# Patient Record
Sex: MALE | Race: WHITE
[De-identification: names, ages, dates, MRNs, and addresses within clinical notes are randomized per-mention and may not be internally consistent; named-entity substitution may affect disease eponyms.]

---

## 2017-07-20 ENCOUNTER — HOSPITAL ENCOUNTER (OUTPATIENT)
Dept: HOSPITAL 39 - LAB.O | Age: 82
End: 2017-07-20
Payer: MEDICARE

## 2017-07-20 DIAGNOSIS — E11.29: ICD-10-CM

## 2017-07-20 DIAGNOSIS — N18.4: Primary | ICD-10-CM

## 2017-07-20 DIAGNOSIS — I12.0: ICD-10-CM

## 2017-09-07 ENCOUNTER — HOSPITAL ENCOUNTER (OUTPATIENT)
Dept: HOSPITAL 39 - GMAJ | Age: 82
Discharge: HOME | End: 2017-09-07
Attending: FAMILY MEDICINE
Payer: MEDICARE

## 2017-09-07 DIAGNOSIS — I10: ICD-10-CM

## 2017-09-07 DIAGNOSIS — E11.9: ICD-10-CM

## 2017-09-07 DIAGNOSIS — Z12.5: Primary | ICD-10-CM

## 2017-11-21 ENCOUNTER — HOSPITAL ENCOUNTER (OUTPATIENT)
Dept: HOSPITAL 39 - LAB.O | Age: 82
Discharge: HOME | End: 2017-11-21
Attending: INTERNAL MEDICINE
Payer: MEDICARE

## 2017-11-21 DIAGNOSIS — N18.4: ICD-10-CM

## 2017-11-21 DIAGNOSIS — C64.2: Primary | ICD-10-CM

## 2018-03-28 ENCOUNTER — HOSPITAL ENCOUNTER (OUTPATIENT)
Dept: HOSPITAL 39 - LAB.O | Age: 83
End: 2018-03-28
Attending: INTERNAL MEDICINE
Payer: MEDICARE

## 2018-03-28 DIAGNOSIS — N18.3: Primary | ICD-10-CM

## 2018-03-28 DIAGNOSIS — I12.0: ICD-10-CM

## 2018-03-28 DIAGNOSIS — E11.9: ICD-10-CM

## 2018-07-17 ENCOUNTER — HOSPITAL ENCOUNTER (OUTPATIENT)
Dept: HOSPITAL 39 - GMAJ | Age: 83
End: 2018-07-17
Attending: FAMILY MEDICINE
Payer: MEDICARE

## 2018-07-17 DIAGNOSIS — N39.0: Primary | ICD-10-CM

## 2018-07-25 ENCOUNTER — HOSPITAL ENCOUNTER (OUTPATIENT)
Dept: HOSPITAL 39 - US | Age: 83
End: 2018-07-25
Attending: INTERNAL MEDICINE
Payer: MEDICARE

## 2018-07-25 DIAGNOSIS — C64.9: ICD-10-CM

## 2018-07-25 DIAGNOSIS — Z90.5: ICD-10-CM

## 2018-07-25 DIAGNOSIS — R31.9: Primary | ICD-10-CM

## 2018-07-25 NOTE — US
EXAM DESCRIPTION: 

Renal: Ultrasound.



CLINICAL HISTORY: 

HEMATURIA, RENAL CELL CARCINOMA. Right nephrectomy.



COMPARISON: 

Bilateral renal arterial Doppler evaluation on the same visit.



TECHNIQUE: 

Transcutaneous scanning: Two-dimensional and Doppler modes. 



FINDINGS: 

Left kidney measures 12.2 x 10.6 x 8.6 cm; mid-renal cortical

thickness 12 mm. . Lobulated hypoechoic heterogeneous mass with

mostly posterior acoustic enhancement measuring 7.5 x 7.1 x 6.5

cm. This appears to be involving the mid kidney and possibly

renal pelvis. Minimally vascular. No hydronephrosis No echogenic

stones. An anechoic cyst with well-defined margins and posterior

enhancement features measures 1.5 x 1.7 x 1.2 cm. Lobulated

contour of the kidney with no perinephric fluid.  Proximal ureter

not visualized. No fluid or soft tissue mass in the right renal

fossa.



Urinary bladder was  visualized. Prevoid volume 118.9 mL.

Ureteral jet in the bladder not seen.  Post void volume 21.3 mL.

Patient voided approximately 80% of initial volume, approximately

98 mL. Minimal bladder wall thickening subjectively. Abdominal

aorta diameter not measured .



IMPRESSION: 

1. 7.5 cm hypoechoic mass, minimally vascular in the region of

the kidney and renal pelvis. No hydronephrosis. Mostly posterior

enhancement features. Differential includes primary or metastatic

disease, complicated cyst or cyst with debris, or residual

inflammatory tissue. Recommend CT scan of the abdomen and pelvis

without and with IV contrast and with oral contrast.

2. Patient is able to void approximately 80% of initial urinary

bladder volume. Subjectively, the bladder wall appears thickened.



Electronically signed by:  Med Rodriguez MD  7/25/2018 7:55 PM CDT

Workstation: [id number]

## 2018-09-20 ENCOUNTER — HOSPITAL ENCOUNTER (OUTPATIENT)
Dept: HOSPITAL 39 - LAB.O | Age: 83
End: 2018-09-20
Attending: INTERNAL MEDICINE
Payer: MEDICARE

## 2018-09-20 DIAGNOSIS — I12.0: Primary | ICD-10-CM

## 2018-09-20 DIAGNOSIS — E11.9: ICD-10-CM

## 2018-09-20 DIAGNOSIS — E11.29: ICD-10-CM

## 2018-09-20 DIAGNOSIS — N18.4: ICD-10-CM

## 2019-02-19 ENCOUNTER — HOSPITAL ENCOUNTER (OUTPATIENT)
Dept: HOSPITAL 39 - LAB.O | Age: 84
End: 2019-02-19
Attending: INTERNAL MEDICINE
Payer: MEDICARE

## 2019-02-19 DIAGNOSIS — E11.29: ICD-10-CM

## 2019-02-19 DIAGNOSIS — E78.2: ICD-10-CM

## 2019-02-19 DIAGNOSIS — N18.4: Primary | ICD-10-CM

## 2019-02-19 DIAGNOSIS — E11.9: ICD-10-CM

## 2019-04-15 ENCOUNTER — HOSPITAL ENCOUNTER (OUTPATIENT)
Dept: HOSPITAL 39 - RAD | Age: 84
End: 2019-04-15
Attending: FAMILY MEDICINE
Payer: MEDICARE

## 2019-04-15 DIAGNOSIS — K44.9: ICD-10-CM

## 2019-04-15 DIAGNOSIS — K43.9: ICD-10-CM

## 2019-04-15 DIAGNOSIS — N28.89: Primary | ICD-10-CM

## 2019-04-15 DIAGNOSIS — Z90.5: ICD-10-CM

## 2019-04-15 DIAGNOSIS — Z90.49: ICD-10-CM

## 2019-04-15 DIAGNOSIS — M51.36: ICD-10-CM

## 2019-04-15 NOTE — CT
EXAM DESCRIPTION: 



Abdoment/Pelvis w/o Contrast



CLINICAL HISTORY: 



88 years, 88 years, Male, Male, HEMATURIA



COMPARISON: 



None.



TECHNIQUE: 



CT of the abdomen and pelvis is performed according to our non

contrast protocol

This exam was performed according to our departmental

dose-optimization program, which includes automated exposure

control, adjustment of the mA and/or kV according to patient size

and/or use of iterative reconstruction technique.



FINDINGS: 



The lung bases demonstrate patchy stranding or scarring at both

lung bases without dense consolidation or pleural effusion or

pulmonary mass. A small retrocardiac hiatal hernia is present.



The liver is small and normal in size without evident gallbladder

or solid or cystic hepatic mass or significant abnormal ductal

dilation. A small normal spleen is present with fatty infiltrated

pancreas. Small normal adrenal glands are noted.



The right kidney is is surgically absent and the aorta heavily

calcified without aneurysm or retroperitoneal adenopathy. The

left kidney is markedly abnormal with a large heterogeneous mass

replacing the posterior two thirds of the kidney and estimated at

a maximal diameter of a proximally 7.8 cm. The kidney is oriented

in an anterior to posterior direction and I am uncertain whether

the mass involves the lower pole or upper pole of the kidney.

Primary renal cell carcinoma is strongly suspected. Benign cyst

involving the cortex of the kidney more anteriorly is present as

well as significant caliectasis of the anterior pole of the

kidney. Further evaluation with contrast-enhanced examination is

recommended. Periaortic or retroperitoneal adenopathy is not

apparent. Left renal vein appears moderately distended and the

possibility of tumor involvement cannot be excluded on this

noncontrast study.



Anterior abdominal wall is markedly abnormal with rather marked

atrophy of the upper portion of the right rectus muscle and the

lower portion of the left rectus muscle. A very small umbilical

hernia is noted. On the left inferiorly a much larger

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

margin of the atrophic left rectus muscles and the lateral

abdominal musculature is noted.



Large and small bowel caliber is normal. The bladder is

incompletely distended and appears thick-walled. Stool distended

rectum is noted. A distinct or definite inguinal hernia is not

apparent.



The bony spine demonstrates degenerative disc changes at L4-5 and

to a lesser extent L5-S1 without bony destructive changes.





IMPRESSION: 



1.  Surgical absence of the right kidney with large almost 8 cm

mass three patent replacing the posterior aspect of the right

kidney with the kidney oriented in an anterior to posterior axis.

The mass is most consistent with a primary renal cell carcinoma

and enhanced CT or MRI examination recommended. Moderate

caliectasis of the anterior pole of the kidney is noted and the

possibility of renal vein involvement with mass cannot be

excluded.

2.  Small hiatal hernia and small fat-containing ventral hernia

with a much larger left lower quadrant anterior abdominal wall

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

3.  Surgical absence of the gallbladder and basilar linear

pulmonary stranding most consistent with scarring and/or

atelectasis.

4.  Lower lumbar degenerative disc disease without evidence of

bony destructive mass.



Electronically signed by:  Jarek Ramsey MD  4/15/2019 3:25 PM

CDT Workstation: [id number]

## 2019-07-05 ENCOUNTER — HOSPITAL ENCOUNTER (EMERGENCY)
Dept: HOSPITAL 39 - ER | Age: 84
Discharge: ANOTHER HOSPITAL | End: 2019-07-05
Payer: MEDICARE

## 2019-07-05 VITALS — SYSTOLIC BLOOD PRESSURE: 159 MMHG | DIASTOLIC BLOOD PRESSURE: 59 MMHG | OXYGEN SATURATION: 96 % | TEMPERATURE: 97.6 F

## 2019-07-05 DIAGNOSIS — I49.3: ICD-10-CM

## 2019-07-05 DIAGNOSIS — R79.89: ICD-10-CM

## 2019-07-05 DIAGNOSIS — I44.0: ICD-10-CM

## 2019-07-05 DIAGNOSIS — I10: ICD-10-CM

## 2019-07-05 DIAGNOSIS — Z79.899: ICD-10-CM

## 2019-07-05 DIAGNOSIS — C64.9: ICD-10-CM

## 2019-07-05 DIAGNOSIS — R11.2: ICD-10-CM

## 2019-07-05 DIAGNOSIS — Z79.82: ICD-10-CM

## 2019-07-05 DIAGNOSIS — Z87.891: ICD-10-CM

## 2019-07-05 DIAGNOSIS — I50.9: Primary | ICD-10-CM

## 2019-07-05 DIAGNOSIS — I48.91: ICD-10-CM

## 2019-07-05 DIAGNOSIS — E11.9: ICD-10-CM

## 2019-07-05 PROCEDURE — 36415 COLL VENOUS BLD VENIPUNCTURE: CPT

## 2019-07-05 PROCEDURE — 82550 ASSAY OF CK (CPK): CPT

## 2019-07-05 PROCEDURE — 83880 ASSAY OF NATRIURETIC PEPTIDE: CPT

## 2019-07-05 PROCEDURE — 85730 THROMBOPLASTIN TIME PARTIAL: CPT

## 2019-07-05 PROCEDURE — 82553 CREATINE MB FRACTION: CPT

## 2019-07-05 PROCEDURE — 93005 ELECTROCARDIOGRAM TRACING: CPT

## 2019-07-05 PROCEDURE — 85025 COMPLETE CBC W/AUTO DIFF WBC: CPT

## 2019-07-05 PROCEDURE — 85610 PROTHROMBIN TIME: CPT

## 2019-07-05 PROCEDURE — 84484 ASSAY OF TROPONIN QUANT: CPT

## 2019-07-05 PROCEDURE — 71045 X-RAY EXAM CHEST 1 VIEW: CPT

## 2019-07-05 PROCEDURE — 80048 BASIC METABOLIC PNL TOTAL CA: CPT

## 2019-07-05 PROCEDURE — 86140 C-REACTIVE PROTEIN: CPT

## 2019-07-05 NOTE — RAD
EXAM DESCRIPTION: Chest,1 View



CLINICAL HISTORY: 88 years Male, weakness



COMPARISON: Previous chest x-ray January 2, 2019



TECHNIQUE: AP portable chest.



FINDINGS:



Heart size is large with large central pulmonary arteries

consistent with pulmonary hypertension. Mildly increased

vascularity more peripherally in the perihilar lower lobe

regions.



No focal consolidating infiltrate to suggest pneumonia.



No pulmonary mass or worrisome nodule.



No pneumothorax or pleural effusion.



Bones are unremarkable.



IMPRESSION:



Larger with increased vascularity.



Electronically signed by:  Aly Hernandez MD  7/5/2019 2:51 PM

CDT Workstation: [id number]

## 2019-07-05 NOTE — ED.PDOC
History of Present Illness





- General


Chief Complaint: General


Stated Complaint: SOB, N/V, weakness, lethargy


Time Seen by Provider: 19 14:04


Source: patient, family





- History of Present Illness


Initial Comments: 





Pt has been feeling weak and SOB x 3 weeks since last immunotherapy infusion 

([employer]).  Pt also has had N/V/D intermittantly.  Pt sent from Clinic because of

abnormal EKG .  Pt has 60% of one kidney remaining.


Timing/Duration: intermittent


Severity: moderate


Improving Factors: immobilization


Worsening Factors: movement


Associated Symptoms: malaise, nausea/vomiting, shortness of breath, weakness


Allergies/Adverse Reactions: 


Allergies





NO KNOWN ALLERGY Allergy (Verified 19 14:24)


   





Home Medications: 


Ambulatory Orders





Allopurinol [Zyloprim] 100 mg PO DAILY 19 


Amlodipine Besylate [Norvasc] 2.5 mg PO DAILY 19 


Aspirin [Adult Aspirin Regimen] 81 mg PO DAILY 19 


Carvedilol 25 mg PO DAILY 19 


Cholecalciferol [Vitamin D3] 2,000 unit PO DAILY 19 


Coenzyme Q10 (Ubidecarenone) [Co Q-10] 200 mg PO DAILY 19 


Cyanocobalamin [Vitamin B-12] 5,000 mcg PO DAILY 19 


Cyproheptadine HCl 2 mg PO DAILY PRN 19 


Glimepiride 1 mg PO DAILY 19 


Mirabegron [Myrbetriq] 25 mg PO DAILY 19 


Niacin [Niacin Tr] 1,000 mg PO DAILY 19 


Nivolumab [Opdivo] 40 mg IV MONTHLY 19 


Oxybutynin Chloride [Ditropan Xl] 10 mg PO DAILY 19 


Pantoprazole Tablet [Protonix] 40 mg PO DAILY 19 


Simvastatin 10 mg PO DAILY 19 


Terazosin [Hytrin] 2 mg PO DAILY 19 


Tramadol HCl [Ultram] 50 mg PO Q6H PRN 19 











Review of Systems





- Review of Systems


Constitutional: States: weakness.  Denies: fever


EENTM: States: no symptoms reported


Respiratory: States: short of breath.  Denies: cough, orthopnea


Cardiology: Denies: chest pain, edema


Gastrointestinal/Abdominal: States: diarrhea, nausea, vomiting.  Denies: 

abdominal pain


Genitourinary: States: no symptoms reported


Musculoskeletal: States: no symptoms reported


Skin: States: no symptoms reported


Neurological: States: no symptoms reported


Endocrine: States: no symptoms reported


Hematologic/Lymphatic: States: no symptoms reported





Past Medical History (General)





- Patient Medical History


Hx Stroke: No


Hx of COPD: No


Hx Cardiac Disorders: No


Hx Congestive Heart Failure: No


Hx Hypertension: Yes


Hx Diabetes: Yes


Hx Cancer: Yes - Renal


Surgical History: appendectomy, cholecystectomy, tonsillectomy





- Vaccination History


Hx Influenza Vaccination: Yes


Hx Pneumococcal Vaccination: Yes





- Social History


Hx Tobacco Use: Yes


Hx Alcohol Use: Yes


Hx Substance Use: No


Hx Substance Use Treatment: No


Hx Depression: No





- Female History


Patient is a Female of Child Bearing Age (10 -59 yrs old): No


Patient Pregnant: No





Family Medical History





- Family History


  ** Mother


Family History: No Known


Living Status: 





Physical Exam





- Physical Exam


General Appearance: Alert, Comfortable


Eye Exam: bilateral normal


Ears, Nose, Throat: hearing grossly normal, normal pharynx, other - dry mucosa


Neck: non-tender, full range of motion, normal inspection


Respiratory: lungs clear, normal breath sounds, no respiratory distress


Cardiovascular/Chest: no edema, irregularly irregular


Gastrointestinal/Abdominal: normal bowel sounds, non tender, soft


Extremity: normal range of motion, non-tender, normal inspection


Neurologic: CNs II-XII nml as tested, alert, normal mood/affect, oriented x 3


Skin Exam: normal color, warm/dry


Lymphatic: no adenopathy





Progress





- EKG/XRAY/CT


EKG: Atrial, Fibrillation, RBBB


Comments: frequent PVC's, rate 95, , QTc 517





Departure





- Departure


Clinical Impression: 


 Elevated troponin I level, New onset atrial fibrillation





CHF (congestive heart failure)


Qualifiers:


 Heart failure type: unspecified Heart failure chronicity: acute Qualified 

Code(s): I50.9 - Heart failure, unspecified





Renal cell carcinoma


Qualifiers:


 Laterality: unspecified laterality Qualified Code(s): C64.9 - Malignant 

neoplasm of unspecified kidney, except renal pelvis





Disposition: Transfer to Hospital


Condition: Fair


Departure Forms:  ED Discharge - Pt. Copy, Patient Portal Self Enrollment


Referrals: 


Marques Joe MD [Primary Care Provider] - 1-2 Weeks


Home Medications: 


Ambulatory Orders





Allopurinol [Zyloprim] 100 mg PO DAILY 19 


Amlodipine Besylate [Norvasc] 2.5 mg PO DAILY 19 


Aspirin [Adult Aspirin Regimen] 81 mg PO DAILY 19 


Carvedilol 25 mg PO DAILY 19 


Cholecalciferol [Vitamin D3] 2,000 unit PO DAILY 19 


Coenzyme Q10 (Ubidecarenone) [Co Q-10] 200 mg PO DAILY 19 


Cyanocobalamin [Vitamin B-12] 5,000 mcg PO DAILY 19 


Cyproheptadine HCl 2 mg PO DAILY PRN 19 


Glimepiride 1 mg PO DAILY 19 


Mirabegron [Myrbetriq] 25 mg PO DAILY 19 


Niacin [Niacin Tr] 1,000 mg PO DAILY 19 


Nivolumab [Opdivo] 40 mg IV MONTHLY 19 


Oxybutynin Chloride [Ditropan Xl] 10 mg PO DAILY 19 


Pantoprazole Tablet [Protonix] 40 mg PO DAILY 19 


Simvastatin 10 mg PO DAILY 19 


Terazosin [Hytrin] 2 mg PO DAILY 19 


Tramadol HCl [Ultram] 50 mg PO Q6H PRN 19 











Critical Care Note





- Critical Care Note


Total Time (mins): 40


Comments: 





Presenting sx's:  SOB, weakness.  Findings:  New onset A. Fib, CHF, Elevated 

Troponin, elevated total CK, Chronic Renal injury with stable creat., 

Hypokalemia


Actions:  IV KCL, IV heparin infusion, transfer to higher level of care


Systems at risk:  Cardiovascular, 





Transfer to Outside Facility





- Transfer Information


Accept[facility] Facility: [facility]


Reason for Transfer: required specialist not available - Dr Lopez accepted pt in

 transfer

## 2019-07-11 ENCOUNTER — HOSPITAL ENCOUNTER (INPATIENT)
Dept: HOSPITAL 39 - ER | Age: 84
LOS: 2 days | DRG: 177 | End: 2019-07-13
Attending: NURSE PRACTITIONER | Admitting: NURSE PRACTITIONER
Payer: MEDICARE

## 2019-07-11 DIAGNOSIS — E78.5: ICD-10-CM

## 2019-07-11 DIAGNOSIS — C64.1: ICD-10-CM

## 2019-07-11 DIAGNOSIS — R47.1: ICD-10-CM

## 2019-07-11 DIAGNOSIS — T45.1X5A: ICD-10-CM

## 2019-07-11 DIAGNOSIS — I63.9: ICD-10-CM

## 2019-07-11 DIAGNOSIS — Z79.01: ICD-10-CM

## 2019-07-11 DIAGNOSIS — R13.13: ICD-10-CM

## 2019-07-11 DIAGNOSIS — Z90.5: ICD-10-CM

## 2019-07-11 DIAGNOSIS — E11.22: ICD-10-CM

## 2019-07-11 DIAGNOSIS — Z66: ICD-10-CM

## 2019-07-11 DIAGNOSIS — I48.2: ICD-10-CM

## 2019-07-11 DIAGNOSIS — I25.10: ICD-10-CM

## 2019-07-11 DIAGNOSIS — K21.9: ICD-10-CM

## 2019-07-11 DIAGNOSIS — N18.9: ICD-10-CM

## 2019-07-11 DIAGNOSIS — Z87.891: ICD-10-CM

## 2019-07-11 DIAGNOSIS — Z82.49: ICD-10-CM

## 2019-07-11 DIAGNOSIS — Z90.49: ICD-10-CM

## 2019-07-11 DIAGNOSIS — R29.810: ICD-10-CM

## 2019-07-11 DIAGNOSIS — I50.23: ICD-10-CM

## 2019-07-11 DIAGNOSIS — I21.4: ICD-10-CM

## 2019-07-11 DIAGNOSIS — N17.9: ICD-10-CM

## 2019-07-11 DIAGNOSIS — Z85.528: ICD-10-CM

## 2019-07-11 DIAGNOSIS — Z79.84: ICD-10-CM

## 2019-07-11 DIAGNOSIS — Z99.81: ICD-10-CM

## 2019-07-11 DIAGNOSIS — Z82.3: ICD-10-CM

## 2019-07-11 DIAGNOSIS — Z63.4: ICD-10-CM

## 2019-07-11 DIAGNOSIS — H53.2: ICD-10-CM

## 2019-07-11 DIAGNOSIS — J69.0: Primary | ICD-10-CM

## 2019-07-11 DIAGNOSIS — I13.0: ICD-10-CM

## 2019-07-11 DIAGNOSIS — R31.9: ICD-10-CM

## 2019-07-11 RX ADMIN — FOLIC ACID SCH DROPS/MIN: 5 INJECTION, SOLUTION INTRAMUSCULAR; INTRAVENOUS; SUBCUTANEOUS at 13:31

## 2019-07-11 RX ADMIN — APIXABAN SCH MG: 2.5 TABLET, FILM COATED ORAL at 21:57

## 2019-07-11 RX ADMIN — AZITHROMYCIN DIHYDRATE SCH MLS/HR: 500 INJECTION, POWDER, LYOPHILIZED, FOR SOLUTION INTRAVENOUS at 23:28

## 2019-07-11 SDOH — SOCIAL STABILITY - SOCIAL INSECURITY: DISSAPEARANCE AND DEATH OF FAMILY MEMBER: Z63.4

## 2019-07-11 NOTE — RAD
EXAM DESCRIPTION: Chest,1 View



CLINICAL HISTORY: 88 years Male, sob



COMPARISON: Previous study July 5, 2019



TECHNIQUE: AP portable chest.



FINDINGS:



Heart size is large with large central pulmonary arteries. Right

hemidiaphragm is elevated.



Infiltrate in the right perihilar region is seen with ill-defined

infiltrative changes in the medial left lung base behind the

heart. Volume loss or pneumonia are favored over pulmonary edema

from heart failure. Previous study showed similar findings.



No pulmonary mass or worrisome nodule.



No pneumothorax. Question small left pleural effusion.



Bones are unremarkable.



IMPRESSION:



Large heart with increased vascularity.



Patchy lower lobe infiltrates or atelectasis



Electronically signed by:  Aly Hernandez MD  7/11/2019 11:26

AM CDT Workstation: [id number]

## 2019-07-11 NOTE — CT
TECHNIQUE:  Spiral CT examination of the neck performed.

Multiplanar reformats performed. This exam was performed

according to our departmental dose-optimization program, which

includes automated exposure control, adjustment of the mA and/or

kV according to patient size and/or use of iterative

reconstruction technique.



CLINICAL HISTORY PROVIDED: diff swallowing



COMPARISON:  None available.



FINDINGS:  



Nasal Cavity:  Unremarkable.



Paranasal Sinuses:  Small mucosal retention cyst within the left

maxillary sinus. Near circumferential thickening in the right

sphenoid sinus locule.



Nasopharynx:  Unremarkable.



Oropharynx:  Unremarkable.



Oral Cavity:  There is high density material in the oral and

esophageal cavity, which presumably relates to recently ingested

contrast material.



Hypopharynx:  Unremarkable.



Larynx:  Unremarkable.



Trachea:  Unremarkable.



Thyroid:  Unremarkable.



Parotid Glands:  Unremarkable.



Submandibular Glands:  Unremarkable.



Carotids / Internal Jugular Veins:  Vascular patency is not

assessed due to lack of intravenous contrast. There is mild

atherosclerotic plaque at the carotid bulbs bilaterally.



Skull Base:  Unremarkable.



Visible Brain and Orbits:  Unremarkable.



Lymph Nodes:  Small lymph nodes are noted bilaterally.



Soft Tissue Mass / Abscess: None present.



Incidental Findings:  Degenerative spondylolisthesis in the

cervical spine. No acute osseous abnormality.





IMPRESSION:  



1. No CT findings to account for difficulty swallowing.

2. High density material lining the oral/esophageal pathway

presumably reflects recently ingested contrast material.



Electronically signed by:  Diaz Govea MD  7/11/2019 1:02 PM

CDT Workstation: [id number]

## 2019-07-11 NOTE — CT
EXAM DESCRIPTION: Head



CLINICAL HISTORY: possible tia



COMPARISON: None available



TECHNIQUE: Contiguous axial images through the head were obtained

without intravenous contrast administration. Sagittal and coronal

reconstructions were reviewed.



FINDINGS:

No evidence of acute major vascular territorial infarct or

intraparenchymal hemorrhage. No intra-axial or extra-axial fluid

collections are identified. No significant mass effect or midline

shift. Moderate volume loss noted. The ventricles and cisterns

appear normal in caliber. The sella and suprasellar regions

appear normal. The structures of the posterior fossa are intact.

The globes are intact bilaterally. 



The visualized paranasal sinuses and mastoid air cells are

well-aerated. Review of the bones demonstrates no gross

instability.



IMPRESSION: 

1. No acute intracranial process.

2. Moderate volume loss.





This exam was performed according to our departmental

dose-optimization program, which includes automated exposure

control, adjustment of the mA and/or kV according to patient size

and/or use of iterative reconstruction technique.



Electronically signed by:  Nagi Rolle MD  7/11/2019

6:19 PM CDT Workstation: [id number]

## 2019-07-11 NOTE — HP
SUPERVISING PHYSICIAN:    Abundio Ott MD



CHIEF COMPLAINT:   Difficulty swallowing and shortness of breath.



HISTORY OF PRESENT ILLNESS:   Mr. Giang is an 88 year-old  male 
patient who was brought to the Emergency Department with EMS from home with 
complaint of shortness of breath and difficulty swallowing.  He does utilize 
home 02.  He had recently been discharged from [facility] after having an 
acute myocardial infarction and developing atrial fibrillation and he was placed
on anticoagulation.  He also has a significant history of renal carcinoma that 
was treated with a nephrectomy with recurrence within this past year.  His 
daughter notes he had been taking chemotherapy and since chemo had developed 
some double vision and difficulty swallowing.  In the Emergency Room today a CT 
of his soft tissue of the neck was completed and showed no findings to account 
for the difficulty swallowing.  He also had a chest x-ray and per radiology 
interpretation was note of a patchy lower lobe atelectasis with volume loss or 
pneumonia favored over pulmonary edema from heart failure.  Vital signs showed a
heart rate of 64, blood pressure of 164/63, saturation 94% on room air at rest. 
His laboratory studies showed a white count of 10,300 with a left shift, 
hemoglobin 11.9, hematocrit 37.1.  Chemistries showed slightly low potassium at 
3.5.  Liver functions were elevated including AST, ALT.  His cardiac enzymes 
also showed an elevation of his troponin up to 3.53 with a CPK of 830.  BNP was 
802.  Given his past history and advanced age and concern for possible CVA 
versus complications from recent chemo and developing bilateral pneumonia and 
given that he has been some difficulty swallowing, there was concern for 
aspiration pneumonia.  Dr. Joe requested the patient be admitted for further 
treatment and evaluation.  Initially, in the Emergency Room he was started on 
antibiotics to include Zosyn and a multivitamin infusion.  The patient is going 
to be admitted and at time of admission he was in stable condition.



PAST MEDICAL HISTORY: 

1.  Recent myocardial infarction 19, non-STEMI with cardiac

      catheterization showing 95% occlusion of LAD with an ejection

      fraction of 40%.

2.  New onset of atrial fibrillation on Eliquis.

3.  History of renal carcinoma initially diagnosed in  treated with 

     right nephrectomy and recurrence in 2019 and currently on chemo.

4.  Type 2 diabetes mellitus.

5.  Hyperlipidemia.

6.  Hypertension.

7.  Gastroesophageal reflux disease.

8.  Hiatal hernias.

9.  Chronic renal failure.



PAST SURGICAL HISTORY:

1.  Cholecystectomy. 

2.  Nephrectomy, right side in .

3.  Hernia repair.



CURRENT MEDICATIONS:

1.  Tramadol 50 mg every 6 hours p.r.n.

2.  Hytrin 2 mg at bedtime.

3.  Simvastatin 10 mg daily.

4.  K-Tabs 10 mEq daily.

5.  Protonix 40 mg daily.

6.  Ditropan 10 mg daily.

7.  Nitrostat as needed.

8.  Niacin 1000 mg at bedtime.

9.  Myrbetriq 25 mg daily.

10.  Toprol 50 mg daily.

11.  Glimepiride 1 mg daily.

12.  Lasix 40 mg daily.

13.  Cyproheptadine 10 mg daily as needed.

14. Vitamin B12, 5000 mcg daily.

15.  Coenzyme 200 mg daily.

16.  Vitamin D3, 2000 units daily.

17.  Eliquis 2.5 mg b.i.d.

18.  Allopurinol 100 mg daily.



ALLERGIES:   No known drug allergies.



CODE STATUS:   Do Not Resuscitate.



HEALTHCARE PROVIDERS:

Nephrologist - Dr. Manzo; Oncologist - Dr. Moore; Urologist - Dr. Hitchcock,all in [place], [place]



FAMILY HISTORY:   Father  at age 49 due to sudden cardiac death and 
myocardial infarction.  Mother  from complications of a stroke.  



SOCIAL HISTORY:  The patient is retired.  He is .  He has one child and 
lives with his daughter.  He has a past history of cigarette smoking but quit in
 and he does not drink alcohol.



REVIEW OF SYSTEMS: 

CONSTITUTIONAL:  Worsened general malaise and weakness with decreased appetite.

HEENT:  Mild dysphagia with some dysarthria as well as diplopia.  Negative for 
headaches or earaches.

CARDIOVASCULAR:  Positive fo recent myocardial infarction on 19 with 
continued cardiac enzymes being elevated and new onset of atrial fibrillation.  
He denies any chest pains but has worsened shortness of breath, especially with 
exertional effort.

CHEST:  Increasing shortness of breath with mildly productive cough, 02 
dependent.

GASTROINTESTINAL:  Negative for nausea, vomiting, diarrhea or constipation.

GENITOURINARY:  Positive for ongoing hematuria secondary to renal carcinoma.  
Denies any dysuria.

MUSCULOSKELETAL:  Generalized weakness but no arthralgias.

SKIN:  Has some easy bruising since starting Eliquis but no lesions or rashes.

NEUROLOGIC:  New onset of dysarthria, dysphagia and diplopia after starting 
chemo and myocardial infarction with concerns for possible CVA.



PHYSICAL EXAMINATION: 



VITAL SIGNS:   Temperature 97.8, pulse 64, blood pressure 164/63, respirations 
16, saturation 93% on 2 liters nasal cannula.  Admission weight 90 kg.



GENERAL:   The patient appears to be in no acute disease.  He is alert, appears 
somewhat dry and a little malnourished and slightly unkept. 



HEENT:   Oropharynx is pink with dry mucous membranes.



NECK:   Supple, non-tender, full range of motion.



CHEST:   Lungs were fairly clear except for some basilar rales and some rhonchi 
heard in the bilateral bases more prominent on the right.  No wheezing is noted.



CARDIOVASCULAR:   Irregular rate and rhythm with no appreciable murmurs, rubs, 
or gallops.



ABDOMEN:   Soft, non-tender, positive bowel sounds.



EXTREMITIES:   1+ pedal edema bilaterally.



NEUROLOGIC:   He has some obvious dysarthria and some dysphagia with diplopia.  
He does show some mild facial asymmetry with mild left facial droop.  All other 
cranial nerves appear grossly intact.  There was no notable nystagmus, although 
it was quite difficult to fully assess as he is having some diplopia.  He was 
alert and oriented x3.   



SKIN:   Pale, pink and dry.



LABORATORY:  White count 10,300, hemoglobin 11.9, hematocrit 37.1, RBC indices 
indicate a microcytosis with a platelet count of 226,000.  Differential did show
a left shift.  Coagulation studies showed a PT of 12.3 with a PTT of 24.8.  
Chemistries showed a low potassium of 3.5, carbon dioxide a little low at 20, 
BUN 50, creatinine 2.7, calcium high at 11.2.  Liver functions showed normal 
bilirubin.  AST was elevated at 103, ALT at 112, CPK elevated at 830 with a 
troponin of 3.53 initially and was showing to be trending down before admission,
3 hours post with 3.37.  Repeat BNP was showing an elevation at 802.  Urinalysis
showed a moderate amount of blood, small amount of bilirubin, 100 of protein.



MICROSCOPIC:  Unremarkable.



RADIOLOGY:  Chest x-ray, single-view, showed enlarged heart with increased 
vasculature and patchy lower lobe infiltrate or atelectasis.  He did have a CT 
soft tissue of the neck and per radiology interpretation, no CT findings to 
account for the difficulty swallowing.  He had a CT of the head and per 
radiology interpretation showed no acute intracranial process is noted.  There 
was note of moderate volume loss.  MRI was attempted but he was unable to lay 
flat due to his increasing shortness of breath.



Echocardiogram performed at [facility] on 19 showed an ejection 
fraction of 40%.  EKG shows atrial fibrillation with bigeminy with a controlled 
ventricular rate.



ASSESSMENT: 

1.  Bilateral community acquired pneumonia.

2.  Acute on chronic congestive heart failure with a reduced 

     ejection fraction with a recent myocardial infarction with a 

     catheterization showing a 95% occlusion of LAD with continued

     elevated troponins.

3.  Acute on chronic renal failure complicated by recent chemotherapy

     and renal carcinoma.

4.  Increasing weakness and severe deconditioning due to difficulty 

     with oral intake from dysphagia.

5.  Concerns for possible right-sided CVA, although possible complications

     from recent chemo with workups pending including mild dysarthria

     with dysphagia and diplopia.

6.  Chronic atrial fibrillation on Eliquis with a controlled ventricular rate.

7.  History of hypertension.

8.  History of gastroesophageal reflux disease.



PLAN:   The patient is going to be admitted for further evaluation and 
treatment.  We will continue with his IV infusion of multivitamin and continue 
with low fluids as he is quite dry.  Will monitor him closely and as needed, 
begin to continue with his Lasix.  He did discuss with his daughter and staff 
that he does want to be a Do Not Resuscitate.  The papers have been signed.  
Also discussed with hospice and both patient and his daughter are wanting to 
talk about hospice and [facility].  We will review his medications 
and update those and resume those as appropriate once they are verified.  Will 
continue DVT prophylaxis with his Eliquis.  Will anticipate his length of stay 
to be at least 2 to 3 days.  Will repeat labs in the morning.  Until we can 
transition him back to outpatient management, we will continue to monitor and 
treat as needed.



#[id number]

[facility]

## 2019-07-11 NOTE — ED.PDOC
History of Present Illness





- General


Chief Complaint: Respiratory Problem


Stated Complaint: difficulty breathing,difficulty swallowing


Time Seen by Provider: 19 10:06


Source: family


Exam Limitations: no limitations





- History of Present Illness


Initial Comments: 





[place] Giang 89 y/o male brought by EMS to ER with SOB on home O2 and 

difficulty swallowing for the last 2 weeks..He was just recently discharged from

[facility] with Acute MI and afib on anti coagulation..has also Renal cell CA and 

DM2.Daughter stated had swallowing studies done at [facility] -.NO chest pains but 

more SOB


Timing/Duration: 7-24 hours


Severity: moderate


Activities at Onset: rest


Possible Cause: occasional episodes


Improving Factors: nothing


Worsening Factors: movement


Associated Symptoms: other - see hpi


Allergies/Adverse Reactions: 


Allergies





NO KNOWN ALLERGY Allergy (Verified 19 14:24)


   





Home Medications: 


Ambulatory Orders





Allopurinol [Zyloprim] 100 mg PO DAILY 19 


Cholecalciferol [Vitamin D3] 2,000 unit PO DAILY 19 


Coenzyme Q10 (Ubidecarenone) [Co Q-10] 200 mg PO DAILY 19 


Cyanocobalamin [Vitamin B-12] 5,000 mcg PO DAILY 19 


Cyproheptadine HCl 2 mg PO DAILY PRN 19 


Glimepiride 1 mg PO DAILY 19 


Mirabegron [Myrbetriq] 25 mg PO DAILY 19 


Niacin [Niacin Tr] 1,000 mg PO BEDTIME 19 


Oxybutynin Chloride [Ditropan Xl] 10 mg PO DAILY 19 


Pantoprazole Tablet [Protonix] 40 mg PO DAILY 19 


Simvastatin 10 mg PO DAILY 19 


Terazosin [Hytrin] 2 mg PO BEDTIME 19 


Tramadol HCl [Ultram] 50 mg PO Q6H PRN 19 


Apixaban [Eliquis] 2.5 mg PO BID 19 


Furosemide [Lasix] 40 mg PO DAILY 19 


Metoprolol Succinate [Toprol XL] 50 mg PO DAILY 19 


Nitroglycerin 0.4 mg Tab [Nitrostat] 1 ea SL PRN 19 


Potassium Chloride [K-Tab] 10 meq PO DAILY 19 











Review of Systems





- Review of Systems


Constitutional: States: no symptoms reported


EENTM: States: see HPI, other - dysphagia


Respiratory: States: see HPI


Cardiology: States: no symptoms reported


Gastrointestinal/Abdominal: States: no symptoms reported


Genitourinary: States: no symptoms reported


Musculoskeletal: States: no symptoms reported


Skin: States: no symptoms reported


Neurological: States: no symptoms reported


All other Systems: Reviewed and Negative, No Change from Baseline





Past Medical History (General)





- Patient Medical History


Hx Stroke: No


Hx of COPD: No


Hx Cardiac Disorders: Yes - recent MI


Hx Congestive Heart Failure: No


Hx Hypertension: Yes


Hx Diabetes: Yes


Hx Renal Disease: Yes


Hx Cancer: Yes - Renal


Surgical History: other - nephrectomy





- Vaccination History


Hx Influenza Vaccination: Yes


Hx Pneumococcal Vaccination: Yes





- Social History


Hx Tobacco Use: Yes


Hx Alcohol Use: Yes


Hx Substance Use: No


Hx Substance Use Treatment: No


Hx Depression: No





- Activities of Daily Living


Grooming Ability: Minimum Assistance


Eating (Feeding) Ability: Minimum Assistance


Toileting Ability: Minimum Assistance





- Female History


Patient is a Female of Child Bearing Age (10 -59 yrs old): No


Patient Pregnant: No





Family Medical History





- Family History


  ** Mother


Family History: No Known


Living Status: 


Hx Family Hypertension: Yes - parents





Physical Exam





- Physical Exam


General Appearance: Alert, Comfortable, No apparent distress


Eyes, Ears, Nose, Throat Exam: normal ENT inspection, pharynx normal


Neck: supple, normal inspection


Respiratory: no respiratory distress, no accessory muscle use, rales - bases


Cardiovascular/Chest: normal peripheral pulses, no gallop, no murmur, irregula

rly irregular


Peripheral Pulses: radial,right: 2+, radial,left: 2+


Gastrointestinal/Abdominal: non tender, soft, no organomegaly


Extremity: no calf tenderness, pedal edema - 1 + pedal edema


Neurologic: no motor/sensory deficits, alert, oriented x 3


Skin Exam: normal color, warm/dry


Lymphatic: no adenopathy





Progress





- Progress


Progress: 





19 10:47


                               Vital Signs - 8 hr











  19





  09:57


 


Temperature 97.6 F


 


Pulse Rate [ 46 L





Left Brachial] 


 


Respiratory 20





Rate 


 


Blood Pressure 170/62





[Left Arm] 


 


O2 Sat by Pulse 94 L





Oximetry 














- Results/Orders


Results/Orders: 





                               Vital Signs - 8 hr











  19/19





  09:57 10:46 11:46


 


Temperature 97.6 F  97.8 F


 


Pulse Rate [ 46 L  64





Left Brachial]   


 


Respiratory 20 20 16





Rate   


 


Blood Pressure 170/62  164/63





[Left Arm]   


 


O2 Sat by Pulse 94 L  93 L





Oximetry   








                                        





19 10:15


EKG STAT 





19 11:55


Hold Metformin x 48Hrs [id number] 





19 13:05


Multiple Vitamin Inj [MVI Injectable] 10 ml   Sodium Chloride 0.9% 1000ML [Ns 

1000 ml] 1,000 ml IVPB ONCE 





19 13:30


Multiple Vitamin Inj [MVI Injectable] 10 ml Folic Acid Inj 1 mg   Sodium 

Chloride 0.9% 1000ML [Ns 1000 ml] 1,000 ml IVS Q24H 





19 14:02


Brain w/oContrast [MRI] Stat 





19 14:07


Admission Order Routine 


Activity:Ambulate w/Assistance .PRN 


Activity:Up in Chair .PRN 


Daily Weight (Standing Scale) DAILY 


Notify:Physician .PRN 


Telemetry Q4H 


Acetaminophen [Tylenol]   650 mg PO Q6H PRN 


Ondansetron Inj [Zofran Inj]   4 mg IV Q6H PRN 


Sodium Chloride 0.9% (Flush) [Saline Flush Syringe]   3 ml IV PRN PRN 





19 14:08


Neurological Checks Q4H 





19 14:09


Pulse Oximetry Assessment DAILY 





19 14:30


IV Set and Cap Change   1 ea INJ Q72H 





19 09:00


Pulse Ox Daily 








                         Laboratory Results - last 24 hr











  19





  10:40 11:50 13:13


 


WBC  10.3  


 


RBC  3.70 L  


 


Hgb  11.9 L  


 


Hct  37.1 L  


 


MCV  100.5 H  


 


MCH  32.3 H  


 


MCHC  32.1 L  


 


RDW  16.5 H  


 


Plt Count  226  


 


MPV  9.1  


 


Absolute Neuts (auto)  8.70 H  


 


Absolute Lymphs (auto)  0.70 L  


 


Absolute Monos (auto)  0.80  


 


Absolute Eos (auto)  0.00  


 


Absolute Basos (auto)  0.00  


 


Neutrophils %  84.6 H  


 


Lymphocytes %  6.8 L  


 


Monocytes %  7.7  


 


Eosinophils %  0.4 L  


 


Basophils %  0.5  


 


PT  12.3 H  


 


INR  1.23 H  


 


PTT (SP)  24.8  


 


Sodium  140  


 


Potassium  3.5 L  


 


Chloride  106  


 


Carbon Dioxide  20 L  


 


Anion Gap  17.5  


 


BUN  50 H  


 


Creatinine  2.87 H  


 


BUN/Creatinine Ratio  17.4  


 


Random Glucose  104  


 


Serum Osmolality  293.0  


 


Calcium  11.2 H  


 


Magnesium  1.9  


 


Total Bilirubin  0.7  


 


Direct Bilirubin  0.1  


 


Indirect Bilirubin  0.6  


 


AST  103 H  


 


ALT  112 H  


 


Alkaline Phosphatase  60  


 


Creatine Kinase  830 H*  


 


CK-MB (CK-2)  113.2 H*  


 


CK-MB (CK-2) %  13.64 H  


 


Troponin I  3.53 H*   3.37 H*


 


B-Natriuretic Peptide  802.0 H*  


 


Serum Total Protein  7.1  


 


Albumin  3.7  


 


Urine Color   Yellow 


 


Urine Appearance   Clear 


 


Urine pH   5.5 


 


Ur Specific Gravity   >= 1.030 


 


Urine Protein   100 H 


 


Urine Glucose (UA)   Negative 


 


Urine Ketones   Trace 


 


Urine Blood   Moderate H 


 


Urine Nitrite   Negative 


 


Urine Bilirubin   Small H 


 


Urine Urobilinogen   0.2 


 


Ur Leukocyte Esterase   Negative 


 


Urine RBC   1-3 


 


Urine WBC   0-1 


 


Ur Epithelial Cells   0-1 


 


Amorphous Sediment   1+ 


 


Urine Bacteria   0 














- EKG/XRAY/CT


EKG: Atrial, Fibrillation


Comments: HR-87





Departure





- Departure


Clinical Impression: 


 Recent myocardial infarction, Atrial fibrillation with normal ventricular rate





Dyspnea


Qualifiers:


 Dyspnea type: unspecified Qualified Code(s): R06.00 - Dyspnea, unspecified





Pneumonia


Qualifiers:


 Pneumonia type: due to unspecified organism Laterality: left Lung location: 

lower lobe of lung Qualified Code(s): J18.1 - Lobar pneumonia, unspecified 

organism





Renal cell cancer


Qualifiers:


 Laterality: left Qualified Code(s): C64.2 - Malignant neoplasm of left kidney, 

except renal pelvis





Renal failure


Qualifiers:


 Renal failure chronicity: chronic Chronic kidney disease stage: stage 3 

(moderate) Qualified Code(s): N18.3 - Chronic kidney disease, stage 3 (moderate)





Dysphagia


Qualifiers:


 Dysphagia type: pharyngeal phase Qualified Code(s): R13.13 - Dysphagia, 

pharyngeal phase





Time of Disposition: 14:49


Disposition: Admit Patient


Condition: Poor


Departure Forms:  ED Discharge - Pt. Copy, Patient Portal Self Enrollment


Referrals: 


Marques Joe MD [Primary Care Provider] - 1-2 Weeks


Home Medications: 


Ambulatory Orders





Allopurinol [Zyloprim] 100 mg PO DAILY 19 


Cholecalciferol [Vitamin D3] 2,000 unit PO DAILY 19 


Coenzyme Q10 (Ubidecarenone) [Co Q-10] 200 mg PO DAILY 19 


Cyanocobalamin [Vitamin B-12] 5,000 mcg PO DAILY 19 


Cyproheptadine HCl 2 mg PO DAILY PRN 19 


Glimepiride 1 mg PO DAILY 19 


Mirabegron [Myrbetriq] 25 mg PO DAILY 19 


Niacin [Niacin Tr] 1,000 mg PO BEDTIME 19 


Oxybutynin Chloride [Ditropan Xl] 10 mg PO DAILY 19 


Pantoprazole Tablet [Protonix] 40 mg PO DAILY 19 


Simvastatin 10 mg PO DAILY 19 


Terazosin [Hytrin] 2 mg PO BEDTIME 19 


Tramadol HCl [Ultram] 50 mg PO Q6H PRN 19 


Apixaban [Eliquis] 2.5 mg PO BID 19 


Furosemide [Lasix] 40 mg PO DAILY 19 


Metoprolol Succinate [Toprol XL] 50 mg PO DAILY 19 


Nitroglycerin 0.4 mg Tab [Nitrostat] 1 ea SL PRN 19 


Potassium Chloride [K-Tab] 10 meq PO DAILY 19 











Decision To Admit





- Decistion To Admit


Decision to Admit Reason: Admit from ER


Decision to Admit Date: 19 - D/W Dr. Joe-Primary Md


Decision to Admit Time: 14:47 - D/W Juarez Fair -ANP/Hospitalist

## 2019-07-12 RX ADMIN — FOLIC ACID SCH DROPS/MIN: 5 INJECTION, SOLUTION INTRAMUSCULAR; INTRAVENOUS; SUBCUTANEOUS at 13:36

## 2019-07-12 RX ADMIN — AMPICILLIN SODIUM AND SULBACTAM SODIUM SCH MLS/HR: 1; .5 INJECTION, POWDER, FOR SOLUTION INTRAMUSCULAR; INTRAVENOUS at 02:44

## 2019-07-12 RX ADMIN — APIXABAN SCH MG: 2.5 TABLET, FILM COATED ORAL at 09:35

## 2019-07-12 RX ADMIN — APIXABAN SCH MG: 2.5 TABLET, FILM COATED ORAL at 21:31

## 2019-07-12 RX ADMIN — LEVALBUTEROL SCH MG: 1.25 SOLUTION RESPIRATORY (INHALATION) at 16:34

## 2019-07-12 RX ADMIN — AZITHROMYCIN DIHYDRATE SCH MLS/HR: 500 INJECTION, POWDER, LYOPHILIZED, FOR SOLUTION INTRAVENOUS at 23:39

## 2019-07-12 RX ADMIN — AMPICILLIN SODIUM AND SULBACTAM SODIUM SCH MLS/HR: 1; .5 INJECTION, POWDER, FOR SOLUTION INTRAMUSCULAR; INTRAVENOUS at 09:35

## 2019-07-12 RX ADMIN — AMPICILLIN SODIUM AND SULBACTAM SODIUM SCH MLS/HR: 1; .5 INJECTION, POWDER, FOR SOLUTION INTRAMUSCULAR; INTRAVENOUS at 21:33

## 2019-07-12 RX ADMIN — LEVALBUTEROL SCH MG: 1.25 SOLUTION RESPIRATORY (INHALATION) at 12:58

## 2019-07-12 RX ADMIN — LEVALBUTEROL SCH MG: 1.25 SOLUTION RESPIRATORY (INHALATION) at 09:00

## 2019-07-12 RX ADMIN — LEVALBUTEROL SCH MG: 1.25 SOLUTION RESPIRATORY (INHALATION) at 20:24

## 2019-07-12 NOTE — PN
DATE:  07/12/19



SUPERVISING PHYSICIAN:  Abundio Ott M.D.



SUBJECTIVE:  The patient is lying in bed.  He is currently having a carotid 
ultrasound done.  He was unable to tolerate the MRI yesterday.  His daughter is 
at the bedside.  The patient has no complaints but his daughter is quite 
concerned about his prognosis.  We discussed different options and I explained 
to the patient that Dr. Joe would be over later to discuss his plan of care.



OBJECTIVE:  Temperature 97.5, heart rate 57, blood pressure 157/56, respiratory 
rate 18, O2 sat 96% on 2.5 liters.  RESPIRATORY:  Essentially clear to 
auscultation.  He has a few scattered crackles and somewhat diminished at the 
bases.  CARDIOVASCULAR:  Regular rate and irregular rhythm.  GASTROINTESTINAL:  
Abdomen is soft, nondistended, non-tender.  Bowel sounds are positive.  
EXTREMITIES:  He has some pedal edema bilaterally.  Pedal pulses are palpable at
+1.  NEUROLOGIC:  He has a mild facial droop on the left side but very minimal. 
He has some mild dysphagia and he does have some difficulty answering some 
questions.  SKIN:  Warm and dry.



LABORATORY:  There is no lab to report at this time.  



RADIOLOGY:  Carotid artery ultrasound shows:

1.   Doppler evaluation of the bilateral carotid systems and vertebral arteries 
show no

      hemodynamically significant stenosis.  Bilateral diameter stenosis less 
than 50%

      by Doppler and less than 20% on Gray scale evaluation.

2.   Minimal amount of plaque seen in carotid arteries bilaterally.  Bilateral 
vertebral

      arteries show antegrade cephalad flow.



All other labs and films have been reviewed via the EMR.



ASSESSMENT: 

1.   Bilateral community acquired pneumonia.

2.   Acute on chronic congestive heart failure with a reduced 

      ejection fraction with a recent myocardial infarction with a 

      catheterization showing a 95% occlusion of LAD with continued

      elevated troponins.

3.   Acute on chronic renal failure complicated by recent chemotherapy

      and renal carcinoma.

4.   Increasing weakness and severe deconditioning due to difficulty 

      with oral intake from dysphagia.

5.   Concerns for possible right-sided CVA, although possible complications

      from recent chemo with workups pending including mild dysarthria

      with dysphagia and diplopia.

6.   Chronic atrial fibrillation on Eliquis with a controlled ventricular rate.

7.   History of hypertension.

8.   History of gastroesophageal reflux disease.



PLAN:  We will continue present supportive care.  He will continue with his 
antibiotics and I will discontinue his IV fluids.  The daughter will discuss his
plan of care and possible hospice admission with Dr. Joe.  I have repeated his
labs for in the morning.  Have good pulmonary hygiene.  Will continue to treat 
as needed.



#[id number]

[facility]

## 2019-07-12 NOTE — US
EXAM DESCRIPTION: 

Carotid Duplex: ULTRASOUND.



CLINICAL HISTORY:

88 years Male TIA right sided



COMPARISON: 

CT scan of the head without contrast 7/11/2019.



TECHNIQUE: 

Transcutaneous scanning utilizing gray-scale and Doppler modes to

evaluate the bilateral carotid systems and vertebral arteries. 

Percentage of diameter of stenosis or no stenosis recorded will

be based upon NASCET criteria.



FINDINGS: 

Peak systolic/end diastolic (CM-Sec) 

CCA Right 95/14 Left 98/15. 

ICA Right proximal 130/19, mid 116/15. Left proximal 72/12,

Distal 100/19.

Vertebral Right 64/0 Left 59/10.

ECA (PS Only) Right 159 left 166.



ICA/CCA peak systolic ratio: Right  1.4  Left 1.0

ICA/CCA end diastolic ratio:  Right   1.3  Left  1.2

Vertebral arteries:  antegrade flow.

Comments: Minimal atherosclerotic plaque bilaterally. Minimal

spectral broadening in the bilateral ICA vessels. Less than 20%

diameter in area stenosis in the proximal left ICA.



IMPRESSION: 

1. Doppler evaluation of the bilateral carotid systems and

vertebral arteries shows no hemodynamically significant stenoses.

Bilateral diameter stenoses less than 50% by Doppler and less

than 20% on grayscale evaluation.

2. Minimal amount of plaque seen in the carotid arteries

bilaterally. Bilateral vertebral arteries showed

antegrade-cephalad flow.



Electronically signed by:  Med Rodriguez MD  7/12/2019 12:27 PM

CDT Workstation: [id number]

## 2019-07-13 VITALS — OXYGEN SATURATION: 96 % | SYSTOLIC BLOOD PRESSURE: 178 MMHG | DIASTOLIC BLOOD PRESSURE: 71 MMHG | TEMPERATURE: 98.2 F

## 2019-07-13 NOTE — DS
SUPERVISING PHYSICIAN:    Abundio Ott MD



ADMISSION DIAGNOSES:

1.  Bilateral community acquired pneumonia.

2.  Acute on chronic congestive heart failure with a reduced 

     ejection fraction with a recent myocardial infarction with a 

     catheterization showing a 95% occlusion of LAD with continued

     elevated troponins.

3.  Acute on chronic renal failure complicated by recent chemotherapy

     and renal carcinoma.

4.  Increasing weakness and severe deconditioning due to difficulty 

     with oral intake from dysphagia.

5.  Concerns for possible right-sided CVA, although possible complications

     from recent chemo with workups pending including mild dysarthria

     with dysphagia and diplopia.

6.  Chronic atrial fibrillation on Eliquis with a controlled ventricular rate.

7.  History of hypertension.

8.  History of gastroesophageal reflux disease.



DISCHARGE DIAGNOSIS:   Death.



HISTORY OF PRESENT ILLNESS:   This is an 88 year-old male patient who came to 
the Emergency Room via EMS with complaints of shortness of breath and difficulty
swallowing.  He has oxygen at home.  He had recently been discharged from [facility] after having an acute myocardial infarction and he developed atrial 
fibrillation and he was placed on anticoagulation.  He also has a significant 
history of renal carcinoma that was treated with a nephrectomy and there was a  
recurrence this past year.  His daughter said that he had been taking 
chemotherapy and after chemotherapy he developed some double vision and 
difficulty swallowing.  In the Emergency Room on the date of admission, a CT of 
his soft tissue of the neck was completed and showed no findings to account for 
the difficulty swallowing.  He also had a chest x-ray and per radiology 
interpretation was note of a patchy lower lobe atelectasis with volume loss or 
pneumonia favored over pulmonary edema from heart failure.  Vital signs showed a
heart rate of 64, blood pressure of 164/63, saturation 94% on room air at rest. 
His laboratory studies showed a white count of 10,300 with a left shift, 
hemoglobin 11.9, hematocrit 37.1.  Chemistries showed a low potassium at 3.5.  
Liver functions were elevated including AST and ALT.  Cardiac enzymes also 
showed an elevation of his troponin up to 3.53 with a CPK of 830.  BNP was 802. 
Given his past history as well as his advanced age and concern for possible CVA 
versus complications from recent chemo as well as developing bilateral pneumonia
as well as the difficulty swallowing, there was concern for aspiration 
pneumonia.  Dr. Joe, his primary care physician, requested the patient be 
admitted for further treatment and evaluation.  He was started on Zosyn in the 
Emergency Room as well as  multivitamin infusion.  He was admitted to the 
hospital in stable condition.



HOSPITAL COURSE:  He received his IV infusion of multivitamin at a low rate the 
next 24 to 36 hours.  He did discuss with his daughter and he was made a Do Not 
Resuscitate.  His daughter also discussed with him eventually being admitted to 
hospice.  The patient multiple times expressed that he did not want anything 
heroic done.  He was continued on his Eliquis and that would suffice for DVT 
prophylaxis.  Dr. Joe came and talked to the family at length and they would 
like to try the antibiotic therapy to see if they could get him where he was 
stronger and then they would discuss hospice after discharge.  Overnight, he 
rested comfortably. About 5:30 on the morning of his death, lab was in his room,
he was awake and his lab was drawn.  X-ray went to his room at 6:15 and he was 
not breathing and there were no heart sounds.



LABORATORY:  Some of the labs and films are per the history of present illness. 
On laboratory, his followup CBC was unremarkable.  His metabolic panel had a 
slightly worsening creatinine at 3.03.  His calcium was slightly high at 10.6.  



RADIOLOGY:   Carotid artery ultrasound showed:

1.  Doppler evaluation of the bilateral carotid systems and vertebral 

     arteries showed no hemodynamically significant stenosis.

2.  Minimal amount of plaque seen in his carotid arteries bilaterally.

     An MRI had been ordered but he was unable to tolerate.



DISCHARGE PLAN:  Death at 6:20 AM on 19.  The patient's body will be 
discharged to  home of family choice.



DISCHARGE MEDICATIONS:  None.



#[id number]

MTDD